# Patient Record
Sex: FEMALE | Race: WHITE | NOT HISPANIC OR LATINO | Employment: OTHER | URBAN - METROPOLITAN AREA
[De-identification: names, ages, dates, MRNs, and addresses within clinical notes are randomized per-mention and may not be internally consistent; named-entity substitution may affect disease eponyms.]

---

## 2020-03-10 PROBLEM — F33.2 SEVERE EPISODE OF RECURRENT MAJOR DEPRESSIVE DISORDER, WITHOUT PSYCHOTIC FEATURES: Status: ACTIVE | Noted: 2020-03-10

## 2020-03-11 PROBLEM — R10.84 GENERALIZED ABDOMINAL PAIN: Status: ACTIVE | Noted: 2020-03-11

## 2020-03-11 PROBLEM — K21.9 GASTROESOPHAGEAL REFLUX DISEASE: Status: ACTIVE | Noted: 2020-03-11

## 2020-03-11 PROBLEM — R25.1 EPISODE OF SHAKING: Status: ACTIVE | Noted: 2020-03-11

## 2020-03-11 PROBLEM — F41.8 OTHER SPECIFIED ANXIETY DISORDERS: Status: ACTIVE | Noted: 2020-03-11

## 2020-03-11 PROBLEM — D50.9 MICROCYTIC ANEMIA: Status: ACTIVE | Noted: 2020-03-11

## 2020-03-11 PROBLEM — M62.838 MUSCLE SPASM: Status: ACTIVE | Noted: 2020-03-11

## 2020-03-11 PROBLEM — Z89.519: Status: ACTIVE | Noted: 2020-03-11

## 2020-03-11 PROBLEM — E55.9 VITAMIN D DEFICIENCY: Status: ACTIVE | Noted: 2020-03-11

## 2020-03-11 PROBLEM — E83.51 HYPOCALCEMIA: Status: ACTIVE | Noted: 2020-03-11

## 2020-06-06 PROBLEM — R07.9 CHEST PAIN: Status: ACTIVE | Noted: 2020-06-06

## 2020-06-06 PROBLEM — J18.9 RIGHT LOWER LOBE PNEUMONIA: Status: ACTIVE | Noted: 2020-06-06

## 2020-06-06 PROBLEM — R06.02 SHORTNESS OF BREATH: Status: ACTIVE | Noted: 2020-06-06

## 2020-06-06 PROBLEM — M81.0 OSTEOPOROSIS: Status: ACTIVE | Noted: 2020-06-06

## 2020-06-06 PROBLEM — E66.01 SEVERE OBESITY (BMI >= 40): Status: ACTIVE | Noted: 2020-06-06

## 2020-06-08 PROBLEM — Z91.81 RISK FOR FALLS: Status: ACTIVE | Noted: 2020-06-08

## 2020-08-21 ENCOUNTER — NURSE TRIAGE (OUTPATIENT)
Dept: ADMINISTRATIVE | Facility: CLINIC | Age: 45
End: 2020-08-21

## 2020-08-21 NOTE — TELEPHONE ENCOUNTER
Migraine headache; amputated leg; cannot leave house because of transportation issues. Referred to [facility]. Verbalized understanding.     Reason for Disposition   Severe headache, states 'worst headache' of life    Additional Information   Negative: Difficult to awaken or acting confused (e.g., disoriented, slurred speech)   Negative: Weakness of the face, arm or leg on one side of the body and new onset   Negative: Numbness of the face, arm or leg on one side of the body and new onset   Negative: Loss of speech or garbled speech and new onset   Negative: Passed out (i.e., fainted, collapsed and was not responding)   Negative: Sounds like a life-threatening emergency to the triager   Negative: Unable to walk without falling   Negative: Stiff neck (can't touch chin to chest)   Negative: Possibility of carbon monoxide exposure    Protocols used: [facility] HEADACHE-A-[place]

## 2020-09-25 PROBLEM — T40.1X1A HEROIN OVERDOSE, ACCIDENTAL OR UNINTENTIONAL, INITIAL ENCOUNTER: Status: ACTIVE | Noted: 2020-09-25

## 2020-09-27 PROBLEM — R09.02 HYPOXEMIA: Status: ACTIVE | Noted: 2020-09-27

## 2020-09-29 PROBLEM — S22.41XD CLOSED FRACTURE OF MULTIPLE RIBS OF RIGHT SIDE WITH ROUTINE HEALING: Status: ACTIVE | Noted: 2020-09-29

## 2020-09-29 PROBLEM — R09.02 HYPOXEMIA: Status: RESOLVED | Noted: 2020-09-27 | Resolved: 2020-09-29

## 2020-09-29 PROBLEM — T40.1X1A HEROIN OVERDOSE, ACCIDENTAL OR UNINTENTIONAL, INITIAL ENCOUNTER: Status: RESOLVED | Noted: 2020-09-25 | Resolved: 2020-09-29

## 2020-09-30 ENCOUNTER — PATIENT OUTREACH (OUTPATIENT)
Dept: ADMINISTRATIVE | Facility: CLINIC | Age: 45
End: 2020-09-30